# Patient Record
Sex: FEMALE | Race: WHITE | NOT HISPANIC OR LATINO
[De-identification: names, ages, dates, MRNs, and addresses within clinical notes are randomized per-mention and may not be internally consistent; named-entity substitution may affect disease eponyms.]

---

## 2022-07-19 PROBLEM — Z00.00 ENCOUNTER FOR PREVENTIVE HEALTH EXAMINATION: Status: ACTIVE | Noted: 2022-07-19

## 2022-07-20 ENCOUNTER — APPOINTMENT (OUTPATIENT)
Dept: CARDIOLOGY | Facility: CLINIC | Age: 72
End: 2022-07-20

## 2022-07-20 VITALS
HEIGHT: 65 IN | SYSTOLIC BLOOD PRESSURE: 140 MMHG | BODY MASS INDEX: 21.16 KG/M2 | HEART RATE: 83 BPM | OXYGEN SATURATION: 100 % | DIASTOLIC BLOOD PRESSURE: 80 MMHG | WEIGHT: 127 LBS

## 2022-07-20 VITALS — SYSTOLIC BLOOD PRESSURE: 146 MMHG | DIASTOLIC BLOOD PRESSURE: 82 MMHG

## 2022-07-20 DIAGNOSIS — H35.30 UNSPECIFIED MACULAR DEGENERATION: ICD-10-CM

## 2022-07-20 DIAGNOSIS — Z78.9 OTHER SPECIFIED HEALTH STATUS: ICD-10-CM

## 2022-07-20 DIAGNOSIS — Z83.3 FAMILY HISTORY OF DIABETES MELLITUS: ICD-10-CM

## 2022-07-20 DIAGNOSIS — Z82.3 FAMILY HISTORY OF STROKE: ICD-10-CM

## 2022-07-20 DIAGNOSIS — L80 VITILIGO: ICD-10-CM

## 2022-07-20 DIAGNOSIS — Z82.49 FAMILY HISTORY OF ISCHEMIC HEART DISEASE AND OTHER DISEASES OF THE CIRCULATORY SYSTEM: ICD-10-CM

## 2022-07-20 PROCEDURE — 99204 OFFICE O/P NEW MOD 45 MIN: CPT

## 2022-07-20 RX ORDER — ALENDRONATE SODIUM 70 MG/1
70 TABLET ORAL
Qty: 12 | Refills: 0 | Status: ACTIVE | COMMUNITY
Start: 2022-06-07

## 2022-07-20 NOTE — DISCUSSION/SUMMARY
[FreeTextEntry1] : 1. HTN: patient's BP ranging from 130-150mmHg systolic at home. Agree with trial of losartan 50mg daily. Recommend low salt diet. Long term BP goal <130/80.\par \par 2. Abnormal ECG/Lightheaded: recommend echocardiogram, stress echocardiogram (resting ST depressions on ECG, so needs imaging) on losartan, and carotid duplex. \par \par Office will call with results. \par \par If testing normal, patient will follow up with her PCP in [place], as she is returning August 15, 2022.

## 2022-07-20 NOTE — PHYSICAL EXAM
[Normal] : moves all extremities, no focal deficits, normal speech [de-identified] : No carotid bruits auscultated bilaterally

## 2022-07-20 NOTE — HISTORY OF PRESENT ILLNESS
[FreeTextEntry1] : 72 year old female with no significant PMHx presents for a cardiac evaluation. She lives in [place] and is up visiting family in [place]. Patient states over the past few days she has been feeling lightheaded, tingling in face. No syncope. No chest pain, dyspnea, or palpitations. \par \par Patient went to an urgent care yesterday. BP was elevated and it was recommended she start losartan 50mg daily. Patient states BP was in the 140s at last PCP visit in [place]. She is has been taking her BP at home and BP running 140s-150s systolic. \par \par There is no history of MI, CVA, CHF, or previous coronary intervention.\par

## 2022-07-21 ENCOUNTER — TRANSCRIPTION ENCOUNTER (OUTPATIENT)
Age: 72
End: 2022-07-21

## 2022-07-21 ENCOUNTER — APPOINTMENT (OUTPATIENT)
Dept: CARDIOLOGY | Facility: CLINIC | Age: 72
End: 2022-07-21

## 2022-07-21 PROCEDURE — 93306 TTE W/DOPPLER COMPLETE: CPT

## 2022-07-21 PROCEDURE — 93880 EXTRACRANIAL BILAT STUDY: CPT

## 2022-07-27 ENCOUNTER — APPOINTMENT (OUTPATIENT)
Dept: CARDIOLOGY | Facility: CLINIC | Age: 72
End: 2022-07-27

## 2022-07-27 PROCEDURE — 93242 EXT ECG>48HR<7D RECORDING: CPT

## 2022-07-27 PROCEDURE — 93351 STRESS TTE COMPLETE: CPT

## 2022-07-27 PROCEDURE — 93320 DOPPLER ECHO COMPLETE: CPT

## 2022-07-27 RX ORDER — LOSARTAN POTASSIUM 50 MG/1
50 TABLET, FILM COATED ORAL DAILY
Qty: 90 | Refills: 0 | Status: DISCONTINUED | COMMUNITY
End: 2022-07-27

## 2022-07-27 RX ORDER — METOPROLOL SUCCINATE 25 MG/1
25 TABLET, EXTENDED RELEASE ORAL AT BEDTIME
Qty: 90 | Refills: 3 | Status: ACTIVE | COMMUNITY
Start: 2022-07-27 | End: 1900-01-01

## 2022-07-29 ENCOUNTER — NON-APPOINTMENT (OUTPATIENT)
Age: 72
End: 2022-07-29

## 2022-08-04 ENCOUNTER — APPOINTMENT (OUTPATIENT)
Dept: CARDIOLOGY | Facility: CLINIC | Age: 72
End: 2022-08-04

## 2022-08-04 PROCEDURE — ZZZZZ: CPT

## 2022-08-04 PROCEDURE — 93244 EXT ECG>48HR<7D REV&INTERPJ: CPT

## 2022-08-05 ENCOUNTER — APPOINTMENT (OUTPATIENT)
Dept: CARDIOLOGY | Facility: CLINIC | Age: 72
End: 2022-08-05

## 2022-08-05 VITALS
HEIGHT: 65 IN | DIASTOLIC BLOOD PRESSURE: 70 MMHG | OXYGEN SATURATION: 98 % | BODY MASS INDEX: 21.16 KG/M2 | SYSTOLIC BLOOD PRESSURE: 132 MMHG | WEIGHT: 127 LBS | HEART RATE: 70 BPM

## 2022-08-05 DIAGNOSIS — I49.1 ATRIAL PREMATURE DEPOLARIZATION: ICD-10-CM

## 2022-08-05 DIAGNOSIS — R94.31 ABNORMAL ELECTROCARDIOGRAM [ECG] [EKG]: ICD-10-CM

## 2022-08-05 DIAGNOSIS — I47.1 SUPRAVENTRICULAR TACHYCARDIA: ICD-10-CM

## 2022-08-05 DIAGNOSIS — R42 DIZZINESS AND GIDDINESS: ICD-10-CM

## 2022-08-05 PROCEDURE — 99214 OFFICE O/P EST MOD 30 MIN: CPT

## 2022-08-05 RX ORDER — MOMETASONE FUROATE 1 MG/G
0.1 OINTMENT TOPICAL
Qty: 45 | Refills: 0 | Status: DISCONTINUED | COMMUNITY
Start: 2022-06-02 | End: 2022-08-05

## 2022-08-05 NOTE — DISCUSSION/SUMMARY
[FreeTextEntry1] : 1. HTN: better controlled with Toprol XL 25mg daily. Recommend low salt diet. Long term BP goal <130/80.\par \par 2. Abnormal ECG/Lightheaded: normal LV augmentation on stress echocardiogram. Structurally normal heart on TTE. \par \par 3. APCs/pSVT: during stress echo patient developed short symptomatic AT/PACs. On Toprol XL 25mg, patient with symptomatic improvement. \par \par Patient is returning to [facility] August 16, 2022, and will follow up with her PCP.

## 2022-08-05 NOTE — CARDIOLOGY SUMMARY
[de-identified] : 7/19/2022, NSR, non-specific ST changes [de-identified] : 7/27/2022, 3 Day Zio: NSR with short runs of AT (longest 12 beats). [de-identified] : 7/27/2022, Stress Echocardiogram: Patient exercised for 3 minutes utilizing standard Billy Protocol with normal LV augmentation at peak stress. Patient developed frequent PACs and short AT episodes. [de-identified] : 7/21/2022, LV EF 60-65%, mild MR, normal LV diastolic function. mild-moderate TR with estimated PASP of 32mmHg. [de-identified] : 7/21/202, Carotid Duplex: no obstructive disease.

## 2022-08-05 NOTE — PHYSICAL EXAM
[Normal] : moves all extremities, no focal deficits, normal speech [de-identified] : No carotid bruits auscultated bilaterally

## 2022-08-05 NOTE — HISTORY OF PRESENT ILLNESS
[FreeTextEntry1] : Historical Perspective:\par 72 year old female with no significant PMHx presents for a cardiac evaluation. She lives in [place] and is up visiting family in [place]. Patient states over the past few days she has been feeling lightheaded, tingling in face. No syncope. No chest pain, dyspnea, or palpitations. \par \par Patient went to an urgent care yesterday. BP was elevated and it was recommended she start losartan 50mg daily. Patient states BP was in the 140s at last PCP visit in [place]. She is has been taking her BP at home and BP running 140s-150s systolic. \par \par There is no history of MI, CVA, CHF, or previous coronary intervention.\par \par Current Health Status:\par Patient with no chest pain, SOB, or palpitations. No hospitalizations since seeing me last. Remains compliant with his medications and reports no adverse effects.\par

## 2022-08-12 ENCOUNTER — NON-APPOINTMENT (OUTPATIENT)
Age: 72
End: 2022-08-12